# Patient Record
Sex: MALE | Race: WHITE | NOT HISPANIC OR LATINO | Employment: OTHER | URBAN - NONMETROPOLITAN AREA
[De-identification: names, ages, dates, MRNs, and addresses within clinical notes are randomized per-mention and may not be internally consistent; named-entity substitution may affect disease eponyms.]

---

## 2024-11-13 ENCOUNTER — ANCILLARY PROCEDURE (OUTPATIENT)
Dept: URGENT CARE | Age: 74
End: 2024-11-13
Payer: COMMERCIAL

## 2024-11-13 ENCOUNTER — OFFICE VISIT (OUTPATIENT)
Dept: URGENT CARE | Age: 74
End: 2024-11-13
Payer: COMMERCIAL

## 2024-11-13 VITALS
HEIGHT: 70 IN | RESPIRATION RATE: 22 BRPM | BODY MASS INDEX: 34.78 KG/M2 | WEIGHT: 242.95 LBS | SYSTOLIC BLOOD PRESSURE: 142 MMHG | TEMPERATURE: 97.7 F | HEART RATE: 80 BPM | OXYGEN SATURATION: 94 % | DIASTOLIC BLOOD PRESSURE: 77 MMHG

## 2024-11-13 DIAGNOSIS — Z72.0 TOBACCO USE: ICD-10-CM

## 2024-11-13 DIAGNOSIS — R05.9 COUGH, UNSPECIFIED TYPE: Primary | ICD-10-CM

## 2024-11-13 DIAGNOSIS — J18.9 PNEUMONIA OF LEFT LUNG DUE TO INFECTIOUS ORGANISM, UNSPECIFIED PART OF LUNG: ICD-10-CM

## 2024-11-13 DIAGNOSIS — R05.9 COUGH, UNSPECIFIED TYPE: ICD-10-CM

## 2024-11-13 LAB
POC RAPID INFLUENZA A: NEGATIVE
POC RAPID INFLUENZA B: NEGATIVE
POC SARS-COV-2 AG BINAX: NORMAL

## 2024-11-13 PROCEDURE — 71046 X-RAY EXAM CHEST 2 VIEWS: CPT

## 2024-11-13 PROCEDURE — 94640 AIRWAY INHALATION TREATMENT: CPT

## 2024-11-13 PROCEDURE — 99214 OFFICE O/P EST MOD 30 MIN: CPT

## 2024-11-13 PROCEDURE — 87804 INFLUENZA ASSAY W/OPTIC: CPT

## 2024-11-13 PROCEDURE — 96372 THER/PROPH/DIAG INJ SC/IM: CPT

## 2024-11-13 PROCEDURE — 87811 SARS-COV-2 COVID19 W/OPTIC: CPT

## 2024-11-13 PROCEDURE — 1036F TOBACCO NON-USER: CPT

## 2024-11-13 PROCEDURE — 1159F MED LIST DOCD IN RCRD: CPT

## 2024-11-13 RX ORDER — LISINOPRIL AND HYDROCHLOROTHIAZIDE 10; 12.5 MG/1; MG/1
1 TABLET ORAL DAILY
COMMUNITY
Start: 2019-12-05

## 2024-11-13 RX ORDER — GABAPENTIN 300 MG/1
600 CAPSULE ORAL
COMMUNITY
Start: 2024-04-23

## 2024-11-13 RX ORDER — AZITHROMYCIN 250 MG/1
TABLET, FILM COATED ORAL
Qty: 6 TABLET | Refills: 0 | Status: SHIPPED | OUTPATIENT
Start: 2024-11-13

## 2024-11-13 RX ORDER — OMEPRAZOLE 20 MG/1
TABLET, ORALLY DISINTEGRATING, DELAYED RELEASE ORAL
COMMUNITY
Start: 2019-12-05

## 2024-11-13 RX ORDER — IPRATROPIUM BROMIDE AND ALBUTEROL SULFATE 2.5; .5 MG/3ML; MG/3ML
3 SOLUTION RESPIRATORY (INHALATION) ONCE
Status: COMPLETED | OUTPATIENT
Start: 2024-11-13 | End: 2024-11-13

## 2024-11-13 RX ORDER — ATORVASTATIN CALCIUM 20 MG/1
20 TABLET, FILM COATED ORAL
COMMUNITY
Start: 2024-03-18

## 2024-11-13 RX ORDER — AMLODIPINE BESYLATE 5 MG/1
1 TABLET ORAL DAILY
COMMUNITY
Start: 2018-09-06

## 2024-11-13 RX ORDER — AMOXICILLIN AND CLAVULANATE POTASSIUM 875; 125 MG/1; MG/1
875 TABLET, FILM COATED ORAL 2 TIMES DAILY
Qty: 14 TABLET | Refills: 0 | Status: SHIPPED | OUTPATIENT
Start: 2024-11-13 | End: 2024-11-20

## 2024-11-13 RX ORDER — ALBUTEROL SULFATE 90 UG/1
INHALANT RESPIRATORY (INHALATION)
COMMUNITY
Start: 2024-03-18

## 2024-11-13 RX ORDER — LISINOPRIL 10 MG/1
TABLET ORAL EVERY 24 HOURS
COMMUNITY

## 2024-11-13 RX ORDER — ALBUTEROL SULFATE 90 UG/1
2 INHALANT RESPIRATORY (INHALATION) EVERY 6 HOURS PRN
Qty: 18 G | Refills: 0 | Status: SHIPPED | OUTPATIENT
Start: 2024-11-13 | End: 2025-11-13

## 2024-11-13 RX ORDER — ACETAMINOPHEN 500 MG
1 TABLET ORAL 2 TIMES DAILY
COMMUNITY
Start: 2019-01-02

## 2024-11-13 ASSESSMENT — ENCOUNTER SYMPTOMS
SORE THROAT: 0
BACK PAIN: 0
COUGH: 1
DYSURIA: 0
WOUND: 0
FEVER: 0
ABDOMINAL PAIN: 0
NAUSEA: 0
SHORTNESS OF BREATH: 0
HEADACHES: 0
FREQUENCY: 0
CHILLS: 0
WHEEZING: 1
DIARRHEA: 0
NECK PAIN: 0
LIGHT-HEADEDNESS: 0
NECK STIFFNESS: 0
VOMITING: 0

## 2024-11-13 NOTE — PROGRESS NOTES
Subjective   Patient ID: Herbert L Pumphrey is a 74 y.o. male. They present today with a chief complaint of Cough (With wheezing and phlegm x 2 days) and Sinus Drainage.    History of Present Illness  74-year-old male with past medical history of remote tobacco use, hypertension presents with complaint of cough, sensation of wheezing, sputum production and sinus drainage.  Symptoms ongoing for 2 days.  He states he feels as though he is wheezy.  No fevers, chills, chest pain, shortness of breath, extremity swelling or pain.  No history of COPD diagnosis.  No admission, hospitalization for COPD.  He does have albuterol inhaler at home.  No oxygen use.      History provided by:  Patient   used: No    Cough  Associated symptoms include wheezing. Pertinent negatives include no chest pain, chills, fever, headaches, rash, sore throat or shortness of breath.       Past Medical History  Allergies as of 11/13/2024    (No Known Allergies)       (Not in a hospital admission)       Past Medical History:   Diagnosis Date    Cyst of kidney, acquired     Kidney cyst, acquired    Personal history of other diseases of the digestive system     History of gastroesophageal reflux (GERD)    Personal history of other endocrine, nutritional and metabolic disease     History of obesity    Personal history of other specified conditions     History of impaired glucose tolerance       No past surgical history on file.     reports that he has never smoked. He has never used smokeless tobacco.    Review of Systems  Review of Systems   Constitutional:  Negative for chills and fever.   HENT:  Positive for congestion. Negative for sore throat.    Respiratory:  Positive for cough and wheezing. Negative for shortness of breath.    Cardiovascular:  Negative for chest pain.   Gastrointestinal:  Negative for abdominal pain, diarrhea, nausea and vomiting.   Genitourinary:  Negative for dysuria, frequency and urgency.  "  Musculoskeletal:  Negative for back pain, neck pain and neck stiffness.   Skin:  Negative for rash and wound.   Neurological:  Negative for syncope, light-headedness and headaches.   All other systems reviewed and are negative.                                 Objective    Vitals:    11/13/24 0856   BP: 142/77   Pulse: 80   Resp: 22   Temp: 36.5 °C (97.7 °F)   TempSrc: Oral   SpO2: 94%   Weight: 110 kg (242 lb 15.2 oz)   Height: 1.778 m (5' 10\")     No LMP for male patient.    Physical Exam  Vitals and nursing note reviewed.   Constitutional:       General: He is not in acute distress.     Appearance: He is normal weight. He is not ill-appearing, toxic-appearing or diaphoretic.   HENT:      Head: Normocephalic and atraumatic.      Nose: Nose normal.      Mouth/Throat:      Mouth: Mucous membranes are moist.      Pharynx: No oropharyngeal exudate or posterior oropharyngeal erythema.      Comments: Oropharynx is widely patent.  Mucous membranes are moist.  No erythema, exudate, edema or asymmetry of posterior pharynx or tonsils.  Uvula is midline and without edema.  No muffled voice or trismus.   Eyes:      General: No scleral icterus.        Right eye: No discharge.         Left eye: No discharge.      Extraocular Movements: Extraocular movements intact.      Conjunctiva/sclera: Conjunctivae normal.      Pupils: Pupils are equal, round, and reactive to light.   Cardiovascular:      Rate and Rhythm: Normal rate and regular rhythm.      Pulses: Normal pulses.      Heart sounds: No murmur heard.     No friction rub. No gallop.   Pulmonary:      Effort: Pulmonary effort is normal. No respiratory distress.      Breath sounds: No stridor. No wheezing, rhonchi or rales.   Abdominal:      General: Abdomen is flat.      Tenderness: There is no abdominal tenderness. There is no guarding or rebound.   Musculoskeletal:      Cervical back: Normal range of motion and neck supple. No rigidity or tenderness.      Right lower leg: " No edema.      Left lower leg: No edema.      Comments: No calf or popliteal tenderness.  No lower extremity edema. LE WWP, sensation intact capillary fill time less than 2 seconds    Skin:     General: Skin is warm and dry.      Capillary Refill: Capillary refill takes less than 2 seconds.   Neurological:      General: No focal deficit present.      Mental Status: He is alert.   Psychiatric:         Mood and Affect: Mood normal.         Behavior: Behavior normal.         Procedures    Point of Care Test & Imaging Results from this visit  Results for orders placed or performed in visit on 11/13/24   POCT Covid-19 Rapid Antigen   Result Value Ref Range    POC FARHAN-COV-2 AG  Presumptive negative test for SARS-CoV-2 (no antigen detected)     Presumptive negative test for SARS-CoV-2 (no antigen detected)   POCT Influenza A/B manually resulted   Result Value Ref Range    POC Rapid Influenza A Negative Negative    POC Rapid Influenza B Negative Negative      XR chest 2 views    Result Date: 11/13/2024  Interpreted By:  Carla Hunt, STUDY: XR CHEST 2 VIEWS;  11/13/2024 9:07 am   INDICATION: Signs/Symptoms:cough wiht wheezing.   COMPARISON: None.   ACCESSION NUMBER(S): [id number]   ORDERING CLINICIAN: AMY CHILDRESS   FINDINGS: CARDIOMEDIASTINAL SILHOUETTE: Cardiomediastinal silhouette is normal in size and configuration.     LUNGS: There is lingular pneumonia. There is no congestion or pleural fluid.   ABDOMEN: No remarkable upper abdominal findings.     BONES: No acute osseous changes.       Lingular pneumonia.   MACRO: None   Signed by: Carla Hunt 11/13/2024 9:10 AM Dictation workstation:   [id number]     Diagnostic study results (if any) were reviewed by Amy Childress PA-C.    Assessment/Plan   Allergies, medications, history, and pertinent labs/EKGs/Imaging reviewed by Amy Childress PA-C.     Medical Decision Making  Old male presents with complaint of cough, sensation of wheezing, sputum production.   Vital signs mild hypertension, 94% SpO2 on room air otherwise unremarkable.  Patient reports wheezing although chest is clear to auscultation.  He is nontoxic and nondistressed.  He is treated with DuoNeb given sensation for wheezing.  Repeat exam states he does not feel much improvement.  Will additionally give dexamethasone given history of tobacco use.  Chest x-ray remarkable for pneumonia.  Prescribed azithromycin and Augmentin.  Ambulatory SpO2 performed prior to discharge with SpO2 consistently about 95-96%.  Emphasized importance of presentation to emergency department for new or worsening symptoms.  He lives with his wife who can make sure that he is feeling okay and not getting any worse.  Follow-up with primary care.  There are no features of presentation concerning for sepsis, severe reactive airway disease, decompensated CHF, pulmonary embolism or other emergent etiology requiring immediate further treatment and evaluation.  Discharged in good condition agreeable to plan as discussed.    Orders and Diagnoses  Diagnoses and all orders for this visit:  Cough, unspecified type  -     POCT Covid-19 Rapid Antigen  -     POCT Influenza A/B manually resulted  -     XR chest 2 views; Future  Tobacco use  -     ipratropium-albuteroL (Duo-Neb) 0.5-2.5 mg/3 mL nebulizer solution 3 mL  -     albuterol 90 mcg/actuation inhaler; Inhale 2 puffs every 6 hours if needed for wheezing.  -     dexAMETHasone (Decadron) 10 mg/mL oral liquid 10 mg  Pneumonia of left lung due to infectious organism, unspecified part of lung  -     amoxicillin-pot clavulanate (Augmentin) 875-125 mg tablet; Take 1 tablet (875 mg) by mouth 2 times a day for 7 days.  -     azithromycin (Zithromax) 250 mg tablet; Take 500 mg (2 tabs) first day, take 1 tab once daily for four days following      Medical Admin Record  Administrations This Visit       dexAMETHasone (Decadron) 10 mg/mL oral liquid 10 mg       Admin Date  11/13/2024 Action  Given  Dose  10 mg Route  oral Documented By  Brook Markley, MA              ipratropium-albuteroL (Duo-Neb) 0.5-2.5 mg/3 mL nebulizer solution 3 mL       Admin Date  11/13/2024 Action  Given Dose  3 mL Route  nebulization Documented By  Brook Markley, MA                    Patient disposition: Home    Electronically signed by Amy Childress PA-C  10:00 PM

## 2024-11-13 NOTE — PATIENT INSTRUCTIONS
If you have chest pain, difficulty breathing or any other new or worsening symptoms please go to emergency department.  Take entire antibiotic course.

## 2024-12-05 DIAGNOSIS — Z72.0 TOBACCO USE: ICD-10-CM

## 2024-12-08 RX ORDER — ALBUTEROL SULFATE 90 UG/1
2 INHALANT RESPIRATORY (INHALATION) EVERY 6 HOURS PRN
Qty: 18 G | Refills: 0 | OUTPATIENT
Start: 2024-12-08

## 2025-07-25 ENCOUNTER — APPOINTMENT (OUTPATIENT)
Dept: CARDIOLOGY | Facility: HOSPITAL | Age: 75
End: 2025-07-25
Payer: OTHER GOVERNMENT

## 2025-07-25 ENCOUNTER — HOSPITAL ENCOUNTER (EMERGENCY)
Facility: HOSPITAL | Age: 75
Discharge: HOME | End: 2025-07-25
Attending: EMERGENCY MEDICINE
Payer: OTHER GOVERNMENT

## 2025-07-25 ENCOUNTER — APPOINTMENT (OUTPATIENT)
Dept: RADIOLOGY | Facility: HOSPITAL | Age: 75
End: 2025-07-25
Payer: OTHER GOVERNMENT

## 2025-07-25 ENCOUNTER — OFFICE VISIT (OUTPATIENT)
Dept: URGENT CARE | Age: 75
End: 2025-07-25
Payer: COMMERCIAL

## 2025-07-25 VITALS
BODY MASS INDEX: 32.28 KG/M2 | DIASTOLIC BLOOD PRESSURE: 84 MMHG | HEART RATE: 84 BPM | TEMPERATURE: 97.4 F | SYSTOLIC BLOOD PRESSURE: 169 MMHG | OXYGEN SATURATION: 97 % | RESPIRATION RATE: 20 BRPM | WEIGHT: 225 LBS

## 2025-07-25 VITALS
RESPIRATION RATE: 18 BRPM | DIASTOLIC BLOOD PRESSURE: 79 MMHG | TEMPERATURE: 98 F | HEART RATE: 72 BPM | HEIGHT: 70 IN | SYSTOLIC BLOOD PRESSURE: 130 MMHG | BODY MASS INDEX: 32.21 KG/M2 | WEIGHT: 225 LBS | OXYGEN SATURATION: 96 %

## 2025-07-25 DIAGNOSIS — R07.89 CHEST TIGHTNESS: Primary | ICD-10-CM

## 2025-07-25 DIAGNOSIS — T50.904A: ICD-10-CM

## 2025-07-25 DIAGNOSIS — T78.2XXA ANAPHYLAXIS, INITIAL ENCOUNTER: ICD-10-CM

## 2025-07-25 DIAGNOSIS — T78.2XXA ANAPHYLAXIS, INITIAL ENCOUNTER: Primary | ICD-10-CM

## 2025-07-25 LAB
ALBUMIN SERPL BCP-MCNC: 4.1 G/DL (ref 3.4–5)
ALP SERPL-CCNC: 76 U/L (ref 33–136)
ALT SERPL W P-5'-P-CCNC: 22 U/L (ref 10–52)
ANION GAP SERPL CALC-SCNC: 17 MMOL/L (ref 10–20)
AST SERPL W P-5'-P-CCNC: 25 U/L (ref 9–39)
BASOPHILS # BLD AUTO: 0.06 X10*3/UL (ref 0–0.1)
BASOPHILS NFR BLD AUTO: 0.5 %
BILIRUB SERPL-MCNC: 0.6 MG/DL (ref 0–1.2)
BUN SERPL-MCNC: 20 MG/DL (ref 6–23)
CALCIUM SERPL-MCNC: 9.5 MG/DL (ref 8.6–10.3)
CARDIAC TROPONIN I PNL SERPL HS: 5 NG/L (ref 0–20)
CARDIAC TROPONIN I PNL SERPL HS: 7 NG/L (ref 0–20)
CHLORIDE SERPL-SCNC: 96 MMOL/L (ref 98–107)
CO2 SERPL-SCNC: 23 MMOL/L (ref 21–32)
CREAT SERPL-MCNC: 1.37 MG/DL (ref 0.5–1.3)
EGFRCR SERPLBLD CKD-EPI 2021: 53 ML/MIN/1.73M*2
EOSINOPHIL # BLD AUTO: 0.42 X10*3/UL (ref 0–0.4)
EOSINOPHIL NFR BLD AUTO: 3.8 %
ERYTHROCYTE [DISTWIDTH] IN BLOOD BY AUTOMATED COUNT: 13.3 % (ref 11.5–14.5)
GLUCOSE SERPL-MCNC: 259 MG/DL (ref 74–99)
HCT VFR BLD AUTO: 40.4 % (ref 41–52)
HGB BLD-MCNC: 14 G/DL (ref 13.5–17.5)
IMM GRANULOCYTES # BLD AUTO: 0.03 X10*3/UL (ref 0–0.5)
IMM GRANULOCYTES NFR BLD AUTO: 0.3 % (ref 0–0.9)
LYMPHOCYTES # BLD AUTO: 4.17 X10*3/UL (ref 0.8–3)
LYMPHOCYTES NFR BLD AUTO: 37.6 %
MAGNESIUM SERPL-MCNC: 1.63 MG/DL (ref 1.6–2.4)
MCH RBC QN AUTO: 30.1 PG (ref 26–34)
MCHC RBC AUTO-ENTMCNC: 34.7 G/DL (ref 32–36)
MCV RBC AUTO: 87 FL (ref 80–100)
MONOCYTES # BLD AUTO: 0.7 X10*3/UL (ref 0.05–0.8)
MONOCYTES NFR BLD AUTO: 6.3 %
NEUTROPHILS # BLD AUTO: 5.72 X10*3/UL (ref 1.6–5.5)
NEUTROPHILS NFR BLD AUTO: 51.5 %
NRBC BLD-RTO: 0 /100 WBCS (ref 0–0)
PLATELET # BLD AUTO: 266 X10*3/UL (ref 150–450)
POTASSIUM SERPL-SCNC: 3.6 MMOL/L (ref 3.5–5.3)
PROT SERPL-MCNC: 6.8 G/DL (ref 6.4–8.2)
RBC # BLD AUTO: 4.65 X10*6/UL (ref 4.5–5.9)
SODIUM SERPL-SCNC: 132 MMOL/L (ref 136–145)
WBC # BLD AUTO: 11.1 X10*3/UL (ref 4.4–11.3)

## 2025-07-25 PROCEDURE — 36415 COLL VENOUS BLD VENIPUNCTURE: CPT | Performed by: EMERGENCY MEDICINE

## 2025-07-25 PROCEDURE — 71045 X-RAY EXAM CHEST 1 VIEW: CPT

## 2025-07-25 PROCEDURE — 93005 ELECTROCARDIOGRAM TRACING: CPT

## 2025-07-25 PROCEDURE — 84484 ASSAY OF TROPONIN QUANT: CPT | Performed by: EMERGENCY MEDICINE

## 2025-07-25 PROCEDURE — 84075 ASSAY ALKALINE PHOSPHATASE: CPT | Performed by: EMERGENCY MEDICINE

## 2025-07-25 PROCEDURE — 1036F TOBACCO NON-USER: CPT

## 2025-07-25 PROCEDURE — 71045 X-RAY EXAM CHEST 1 VIEW: CPT | Performed by: RADIOLOGY

## 2025-07-25 PROCEDURE — 85025 COMPLETE CBC W/AUTO DIFF WBC: CPT | Performed by: EMERGENCY MEDICINE

## 2025-07-25 PROCEDURE — 83735 ASSAY OF MAGNESIUM: CPT | Performed by: EMERGENCY MEDICINE

## 2025-07-25 PROCEDURE — 99285 EMERGENCY DEPT VISIT HI MDM: CPT | Performed by: EMERGENCY MEDICINE

## 2025-07-25 PROCEDURE — 1159F MED LIST DOCD IN RCRD: CPT

## 2025-07-25 RX ORDER — FAMOTIDINE 20 MG/1
20 TABLET, FILM COATED ORAL ONCE
Status: COMPLETED | OUTPATIENT
Start: 2025-07-25 | End: 2025-07-25

## 2025-07-25 RX ORDER — EPINEPHRINE 1 MG/ML
0.3 INJECTION INTRAMUSCULAR; INTRAVENOUS; SUBCUTANEOUS ONCE
Status: COMPLETED | OUTPATIENT
Start: 2025-07-25 | End: 2025-07-25

## 2025-07-25 RX ORDER — PREDNISONE 5 MG/1
TABLET ORAL
Qty: 36 TABLET | Refills: 0 | Status: SHIPPED | OUTPATIENT
Start: 2025-07-25

## 2025-07-25 RX ORDER — METHYLPREDNISOLONE SODIUM SUCCINATE 125 MG/2ML
125 INJECTION INTRAMUSCULAR; INTRAVENOUS ONCE
Status: COMPLETED | OUTPATIENT
Start: 2025-07-25 | End: 2025-07-25

## 2025-07-25 RX ORDER — DIPHENHYDRAMINE HYDROCHLORIDE 50 MG/ML
50 INJECTION, SOLUTION INTRAMUSCULAR; INTRAVENOUS ONCE
Status: COMPLETED | OUTPATIENT
Start: 2025-07-25 | End: 2025-07-25

## 2025-07-25 RX ORDER — EPINEPHRINE 0.3 MG/.3ML
1 INJECTION SUBCUTANEOUS ONCE AS NEEDED
Qty: 1 EACH | Refills: 1 | Status: SHIPPED | OUTPATIENT
Start: 2025-07-25

## 2025-07-25 RX ADMIN — DIPHENHYDRAMINE HYDROCHLORIDE 50 MG: 50 INJECTION, SOLUTION INTRAMUSCULAR; INTRAVENOUS at 17:55

## 2025-07-25 RX ADMIN — METHYLPREDNISOLONE SODIUM SUCCINATE 125 MG: 125 INJECTION INTRAMUSCULAR; INTRAVENOUS at 17:54

## 2025-07-25 RX ADMIN — FAMOTIDINE 20 MG: 20 TABLET, FILM COATED ORAL at 17:54

## 2025-07-25 RX ADMIN — EPINEPHRINE 0.3 MG: 1 INJECTION INTRAMUSCULAR; INTRAVENOUS; SUBCUTANEOUS at 17:56

## 2025-07-25 ASSESSMENT — PAIN SCALES - GENERAL
PAINLEVEL_OUTOF10: 0 - NO PAIN
PAINLEVEL_OUTOF10: 3
PAINLEVEL_OUTOF10: 0 - NO PAIN

## 2025-07-25 ASSESSMENT — PAIN - FUNCTIONAL ASSESSMENT
PAIN_FUNCTIONAL_ASSESSMENT: 0-10
PAIN_FUNCTIONAL_ASSESSMENT: 0-10

## 2025-07-25 NOTE — ED PROVIDER NOTES
HPI   Chief Complaint   Patient presents with    Allergic Reaction     Pt reports stating to get a rash that was only a few spots, then it spread. Pt reports he then started to have sob and tightness in his chest. Pt went to urgent care and was given epi, solumedrol, diphenhydramine, and famotidine, then sent to ER.        HPI        Patient History   Medical History[1]  Surgical History[2]  Family History[3]  Social History[4]    Physical Exam   ED Triage Vitals   Temperature Heart Rate Respirations BP   07/25/25 1730 07/25/25 1730 07/25/25 1730 07/25/25 1730   36.9 °C (98.5 °F) 92 (!) 21 146/70      Pulse Ox Temp Source Heart Rate Source Patient Position   07/25/25 1730 07/25/25 1730 -- 07/25/25 1730   96 % Temporal  Lying      BP Location FiO2 (%)     07/25/25 1730 --     Left arm        Physical Exam  Constitutional:       General: He is not in acute distress.     Appearance: Normal appearance. He is not toxic-appearing.   HENT:      Head: Normocephalic and atraumatic.      Right Ear: Tympanic membrane normal.      Left Ear: Tympanic membrane normal.      Mouth/Throat:      Mouth: Mucous membranes are moist.      Pharynx: Oropharynx is clear.     Eyes:      Conjunctiva/sclera: Conjunctivae normal.      Pupils: Pupils are equal, round, and reactive to light.       Cardiovascular:      Rate and Rhythm: Normal rate and regular rhythm.      Pulses: Normal pulses.      Heart sounds: Normal heart sounds.   Pulmonary:      Effort: Pulmonary effort is normal. No respiratory distress.      Breath sounds: Normal breath sounds. No wheezing.   Abdominal:      General: Bowel sounds are normal.      Palpations: Abdomen is soft.      Tenderness: There is no abdominal tenderness. There is no guarding or rebound.     Musculoskeletal:         General: Normal range of motion.      Cervical back: Normal range of motion.     Skin:     General: Skin is warm and dry.     Neurological:      General: No focal deficit present.       Mental Status: He is alert and oriented to person, place, and time.           ED Course & MDM   ED Course as of 07/25/25 1829 Fri Jul 25, 2025   1744 EKG performed at 1744 showing normal sinus rhythm ventricular rate of 84 first-degree block.  Ventricular rate of 84 no indication of a STEMI at this time no ST elevation or depression interpreted by me.  [KA]      ED Course User Index  [KA] Harry Soto, DO         Diagnoses as of 07/25/25 1829   Chest tightness   Allergic reaction to wrong substance given or taken, undetermined intent, initial encounter                 No data recorded     [place] Coma Scale Score: 15 (07/25/25 1735 : Katiana Herrera)                           Medical Decision Making  75-year-old male presents to the ER with chief complaint of eating [employer] and started having a rash started having some chest pain and shortness of breath.  Patient went to urgent care urgent care treated him with Solu-Medrol Benadryl and epi.  Normal was called by the time 911 arrived patient was asymptomatic.  Patient arrived here plan is to observe the patient.  But he did tell me he was having some chest tightness for this reason we will do a cardiac workup.  If all results are negative I do believe the patient will be discharged home however we will sign the patient out to Dr. Beatty.        Procedure  Procedures       [1]   Past Medical History:  Diagnosis Date    Cyst of kidney, acquired     Kidney cyst, acquired    Hypertension     Personal history of other diseases of the digestive system     History of gastroesophageal reflux (GERD)    Personal history of other endocrine, nutritional and metabolic disease     History of obesity    Personal history of other specified conditions     History of impaired glucose tolerance   [2] History reviewed. No pertinent surgical history.  [3] No family history on file.  [4]   Social History  Tobacco Use    Smoking status: Never    Smokeless tobacco: Never    Substance Use Topics    Alcohol use: Yes     Comment: occasional    Drug use: Never        Harry Soto, DO  07/25/25 1133

## 2025-07-25 NOTE — PROGRESS NOTES
Subjective   Patient ID: Herbert L Pumphrey is a 75 y.o. male. They present today with a chief complaint of Rash (Blistered bumps on his arms appeared today and keep spreading ) and Shortness of Breath (Patient states slightly more short of breath then normal COPD hx).    History of Present Illness  75-year-old male with vitals blood pressure 169/84, temperature 97.4, heart rate 84 bpm, respirations 20, SpO2 97% has urticarial rash bilateral upper extremities as well as small amount on abdomen he noticed about 2-1/2 hours ago has COPD states he feels slightly more short of breath and has some more chest tightness.  Denies any current fevers or chills, nausea vomiting or sweats, abdominal pain, focal deficits.  Denies being stung or bit by an insect, any new medications or changes in medication doses, or any known allergies.  States he got [employer] and then noticed the rash and became slightly short of breath.  Shortly after history and exam patient shortness of breath/chest tightness began to worsen and we immediately gave 0.3 mg epinephrine, 125 Solu-Medrol IM 50 mg Benadryl IM, 20 mg famotidine p.o. and seconds after epi patient's breathing improved significantly.  Ambulance was called and patient was taken to ER via ambulance.  Notified Dr. Schuler.          Past Medical History  Allergies as of 07/25/2025    (No Known Allergies)       Prescriptions Prior to Admission[1]     Medical History[2]    Surgical History[3]     reports that he has never smoked. He has never used smokeless tobacco.    Review of Systems  Review of Systems   All other systems reviewed and are negative.                                 Objective    Vitals:    07/25/25 1633   BP: 169/84   Pulse: 84   Resp: 17   Temp: 36.3 °C (97.4 °F)   TempSrc: Oral   SpO2: 96%   Weight: 102 kg (225 lb)     No LMP for male patient.    Physical Exam  Vitals reviewed.   Constitutional:       General: He is not in acute distress.     Appearance: Normal appearance.  He is not ill-appearing, toxic-appearing or diaphoretic.   HENT:      Head: Normocephalic and atraumatic.      Nose: Nose normal.      Mouth/Throat:      Mouth: Mucous membranes are moist.      Pharynx: Oropharynx is clear.      Comments: Airway clear.  No stridor.  No drooling.  No neck swelling.  No lip or tongue or oral mucosal swelling.    Cardiovascular:      Rate and Rhythm: Normal rate and regular rhythm.   Pulmonary:      Breath sounds: Normal breath sounds.      Comments: Initially patient had normal respiratory effort shortly after history and exam breathing became more labored.  After epinephrine breathing returned to normal.  Lung sounds normal bilateral upper and lower lobes before and after epinephrine.  Abdominal:      General: Abdomen is flat.      Palpations: Abdomen is soft.      Tenderness: There is no abdominal tenderness. There is no right CVA tenderness or left CVA tenderness.     Musculoskeletal:      Cervical back: Normal range of motion and neck supple. No rigidity or tenderness.   Lymphadenopathy:      Cervical: No cervical adenopathy.     Skin:     General: Skin is warm and dry.     Neurological:      General: No focal deficit present.      Mental Status: He is alert and oriented to person, place, and time.     Psychiatric:         Mood and Affect: Mood normal.         Behavior: Behavior normal.         Procedures    Point of Care Test & Imaging Results from this visit  No results found for this visit on 07/25/25.   Imaging  No results found.    Cardiology, Vascular, and Other Imaging  No other imaging results found for the past 2 days      Diagnostic study results (if any) were reviewed by Mleanie Ren PA-C.    Assessment/Plan   Allergies, medications, history, and pertinent labs/EKGs/Imaging reviewed by Melanie Ren PA-C.     Medical Decision Making  75-year-old male with vitals blood pressure 169/84, temperature 97.4, heart rate 84 bpm, respirations 20, SpO2 97% has  urticarial rash bilateral upper extremities as well as small amount on abdomen he noticed about 2-1/2 hours ago has COPD states he feels slightly more short of breath and has some more chest tightness.  Denies any current fevers or chills, nausea vomiting or sweats, abdominal pain, focal deficits.  Denies being stung or bit by an insect, any new medications or changes in medication doses, or any known allergies.  States he got [employer] and then noticed the rash and became slightly short of breath.  Shortly after history and exam patient shortness of breath/chest tightness began to worsen and we immediately gave 0.3 mg epinephrine, 125 Solu-Medrol IM 50 mg Benadryl IM, 20 mg famotidine p.o. and seconds after epi patient's breathing improved significantly.  Ambulance was called and patient was taken to ER via ambulance.  Notified Dr. Schuler.    Orders and Diagnoses  There are no diagnoses linked to this encounter.    Medical Admin Record      Patient disposition: ED. Transported by: EMS.    Electronically signed by Melanie Ren PA-C  4:43 PM           [1] (Not in a hospital admission)  [2]   Past Medical History:  Diagnosis Date    Cyst of kidney, acquired     Kidney cyst, acquired    Personal history of other diseases of the digestive system     History of gastroesophageal reflux (GERD)    Personal history of other endocrine, nutritional and metabolic disease     History of obesity    Personal history of other specified conditions     History of impaired glucose tolerance   [3] No past surgical history on file.

## 2025-07-26 NOTE — PROGRESS NOTES
Emergency Medicine Transition of Care Note.    I received Herbert L Pumphrey in signout from   Dr. Cline.  Please see the previous ED provider note for all HPI, PE and MDM up to the time of signout at 1900 hrs. This is in addition to the primary record.    In brief Herbert L Pumphrey is an 75 y.o. male presenting for   Chief Complaint   Patient presents with    Allergic Reaction     Pt reports stating to get a rash that was only a few spots, then it spread. Pt reports he then started to have sob and tightness in his chest. Pt went to urgent care and was given epi, solumedrol, diphenhydramine, and famotidine, then sent to ER.      At the time of signout we were awaiting: Troponins and observation.    ED Course as of 07/25/25 2059 Fri Jul 25, 2025 1744 EKG performed at 1744 showing normal sinus rhythm ventricular rate of 84 first-degree block.  Ventricular rate of 84 no indication of a STEMI at this time no ST elevation or depression interpreted by me.  [KA]      ED Course User Index  [KA] Harry Soto, DO         Diagnoses as of 07/25/25 2059   Chest tightness   Allergic reaction to wrong substance given or taken, undetermined intent, initial encounter   Anaphylaxis, initial encounter       Medical Decision Making  Patient's troponins were both negative.  His workup was also negative.  He has had no more swelling or discomfort.  No difficulty breathing.  He stable for discharge.  I have written prescriptions for an EpiPen with a refill as well as a tapered dose of prednisone.  I have urged him to return if he has recurrent problems even if he has to use his EpiPen and he feels better.        Final diagnoses:   [R07.89] Chest tightness   [T50.904A] Allergic reaction to wrong substance given or taken, undetermined intent, initial encounter   [T78.2XXA] Anaphylaxis, initial encounter           Procedure  Procedures    Shane Beatty MD

## 2025-07-26 NOTE — ED NOTES
Patient given discharge instructions and verbalizes understanding. Pt aware of prescriptions sent to pharmacy. IV removed. VS stable. Pt left ED ambulatory with all belongings.      Sonja Snow RN  07/25/25 0263

## 2025-07-29 LAB
ATRIAL RATE: 84 BPM
P AXIS: 42 DEGREES
P OFFSET: 146 MS
P ONSET: 85 MS
PR INTERVAL: 274 MS
Q ONSET: 222 MS
QRS COUNT: 13 BEATS
QRS DURATION: 96 MS
QT INTERVAL: 394 MS
QTC CALCULATION(BAZETT): 465 MS
QTC FREDERICIA: 440 MS
R AXIS: 44 DEGREES
T AXIS: 54 DEGREES
T OFFSET: 419 MS
VENTRICULAR RATE: 84 BPM
